# Patient Record
Sex: FEMALE | Race: WHITE
[De-identification: names, ages, dates, MRNs, and addresses within clinical notes are randomized per-mention and may not be internally consistent; named-entity substitution may affect disease eponyms.]

---

## 2019-08-01 ENCOUNTER — HOSPITAL ENCOUNTER (OUTPATIENT)
Dept: HOSPITAL 91 - OBT | Age: 24
Discharge: HOME | End: 2019-08-01
Payer: MEDICAID

## 2019-08-01 ENCOUNTER — HOSPITAL ENCOUNTER (OUTPATIENT)
Dept: HOSPITAL 10 - L-D | Age: 24
Discharge: HOME | End: 2019-08-01
Attending: OBSTETRICS & GYNECOLOGY
Payer: MEDICAID

## 2019-08-01 VITALS
BODY MASS INDEX: 36.01 KG/M2 | HEIGHT: 60 IN | WEIGHT: 183.42 LBS | BODY MASS INDEX: 36.01 KG/M2 | HEIGHT: 60 IN | WEIGHT: 183.42 LBS

## 2019-08-01 DIAGNOSIS — Z3A.38: ICD-10-CM

## 2019-08-01 DIAGNOSIS — O36.8130: Primary | ICD-10-CM

## 2019-08-01 PROCEDURE — 76818 FETAL BIOPHYS PROFILE W/NST: CPT

## 2019-08-01 PROCEDURE — G0463 HOSPITAL OUTPT CLINIC VISIT: HCPCS

## 2019-08-01 NOTE — TRIAGE
===================================

OB Triage

===================================

Datetime Report Generated by CPN: 08/01/2019 15:11

   

   

===========================

Datetime: 08/01/2019 14:15

===========================

   

   

===================================

Labor Evaluation

===================================

   

 Frequency:  irreg.

 Monitor Mode:  External

 Quality:  Mild

 Pattern:  Normal: <= 5 Contractions in 10 Minutes

 Resting Tone [place]:  Relaxed

 Contraction Comments:  Ptients denies feeling UC's - palpate soft

   

===================================

Fetal Heart Rate

===================================

   

 Monitor Mode:  External US

 Variability:  Moderate 6-25 bpm

 Accelerations:  15X15

 Decelerations:  None

 Category:  Category I

   

===================================

Pain Assessment

===================================

   

 Pain Presence:  None/Denies

 Pain Type:  N/A

   

===========================

Datetime: 08/01/2019 14:08

===========================

   

 EGA:  38.1

   

===========================

Datetime: 08/01/2019 13:39

===========================

   

 Assessment Type:  Triage

   

===================================

Maternal Assessment

===================================

   

 Level of Consciousness:  Keenly Alert, Responsive

 DTR's/Clonus:  DTRs 2+; No Clonus

 Headache:  Denies

 Blurred Vision:  No

 Respiratory Effort:  Unlabored; Regular Rhythm; Equal Expansion

 Breath Sounds, Left:  Clear and Equal

 Breath Sounds, Right:  Clear and Equal

 Nausea/Vomiting:  Denies

 RUQ Epigastric Pain:  Denies

 Lower Extremities Edema:  None

     Degree:  None

 Upper Extremities Edema:  None

     Degree:  None

 Facial Edema:  None

   

===================================

Fall Risk Assessment

===================================

   

 History of Falling:  (0) No

 Secondary Diagnosis:  (0) No

 Ambulatory Aid:  (0) Bedrest/Nurse Assist

 IV Therapy:  (0) No

 Gait:  (0) Normal/Bedrest/Immobile

 Mental Status:  (0) Oriented to Own Ability

 Fall Score:  0

 Fall Risk Score Definition:  No Risk: No action required

   

===========================

Datetime: 08/01/2019 13:36

===========================

   

 Time of Arrival:  08/01/2019 12:50

 Arrived By:  Ambulatory

 Arrived From:  Home

 Chief Complaint:  Decreased FM x 4 days

 Fetal Movement:  Decreased

 Contractions:  Denies/Absent

 Rupture of Membranes:  Denies

 Vaginal Bleeding:  None

 Vaginal Discharge:  Denies

 Abdominal Trauma:  Not Applicable

 Patient Complaints:  None

 Time Provider Notified:  08/01/2019 14:50

 Provider Notified:  RADHA

 Initial Plan:  bedside ultraside for MARCELLE and BPP

## 2019-08-01 NOTE — PN
Triage Information


Date/Time





 2019


Reason for visit:  DFM


Weeks of Gestation


38 weeks and 1 days


/Para





Additional information


24 years old  with IUP at 38 weeks and 1 days presented with complaint of 


decreased fetal movement, Denies any LOF or vaginal bleeding or uteirne 


contractions





Objective


VSS


Fetal Heart Rate:  130's


Fetal Heart Rate Comments


NST: Cat 1


Contractions:  None


Exam


GA: A^O, NAD


Abdomen: Soft, Gravid. Size consistent with date


NST: Cat 1


BPP: 8/8


MARCELLE: 12.8 cm





Results/Medications


Imaging Results


PROCEDURE:   OB ultrasound for biophysical profile 


 


CLINICAL INDICATION:   Decreased fetal movement.


 


TECHNIQUE:   Multiple sonographic images of the pelvis were obtained.  


Transabdominal view of the gravid uterus are available for review.  The images 


were reviewed on a PACS workstation.  


 


COMPARISON:   None 


 


FINDINGS:


 


Fetal breathing movement = 2/2


Fetal tone = 2/2


Fetal motion = 2/2


Quantitative amniotic fluid volume = 2/2


 


MARCELLE = 12.8 cm


Single live intrauterine pregnancy with fetal cardiac activity at 129 beats per 


minute.  


There is a anterior placenta without previa or abruption.


 


IMPRESSION:


 


1.  Single living intrauterine gestation in cephalic position.  


2.  Biophysical profile = 8/8.


3.  MARCELLE = 12.8 cm.


Disposition:  Discharge


Assessment/Plan


IUP at 38 weeks and 1 days


Decreased fetal movement


 testing reassuring


Doing well


No evidence of labor or PROM





DC home


Follow up in 2-3 days with OB office or sooner PRN


RT Traige if again experiences any LOF,. Vaginal bleeding, Decreased fetal 


movement or for any other concerns











CHRISTOPHER GARCIA MD               Aug 1, 2019 15:36

## 2019-08-09 ENCOUNTER — HOSPITAL ENCOUNTER (INPATIENT)
Dept: HOSPITAL 91 - OBT | Age: 24
LOS: 3 days | Discharge: HOME | End: 2019-08-12
Payer: MEDICAID

## 2019-08-09 ENCOUNTER — HOSPITAL ENCOUNTER (INPATIENT)
Dept: HOSPITAL 10 - L-D | Age: 24
LOS: 3 days | Discharge: HOME | End: 2019-08-12
Attending: OBSTETRICS & GYNECOLOGY | Admitting: OBSTETRICS & GYNECOLOGY
Payer: MEDICAID

## 2019-08-09 VITALS — HEART RATE: 76 BPM | RESPIRATION RATE: 18 BRPM | SYSTOLIC BLOOD PRESSURE: 135 MMHG | DIASTOLIC BLOOD PRESSURE: 74 MMHG

## 2019-08-09 VITALS — WEIGHT: 185.63 LBS | HEIGHT: 60 IN | BODY MASS INDEX: 36.44 KG/M2

## 2019-08-09 DIAGNOSIS — Z3A.39: ICD-10-CM

## 2019-08-09 LAB
ADD MAN DIFF?: NO
BASOPHIL #: 0.1 10^3/UL (ref 0–0.1)
BASOPHILS %: 0.5 % (ref 0–2)
EOSINOPHILS #: 0.1 10^3/UL (ref 0–0.5)
EOSINOPHILS %: 0.6 % (ref 0–7)
HEMATOCRIT: 37.4 % (ref 37–47)
HEMOGLOBIN: 12.3 G/DL (ref 12–16)
IMMATURE GRANS #M: 0.08 10^3/UL (ref 0–0.03)
IMMATURE GRANS % (M): 0.6 % (ref 0–0.43)
INR: 0.9
LYMPHOCYTES #: 2 10^3/UL (ref 0.8–2.9)
LYMPHOCYTES %: 15.8 % (ref 15–51)
MEAN CORPUSCULAR HEMOGLOBIN: 29.9 PG (ref 29–33)
MEAN CORPUSCULAR HGB CONC: 32.9 G/DL (ref 32–37)
MEAN CORPUSCULAR VOLUME: 90.8 FL (ref 82–101)
MEAN PLATELET VOLUME: 11.8 FL (ref 7.4–10.4)
MONOCYTE #: 0.7 10^3/UL (ref 0.3–0.9)
MONOCYTES %: 5.4 % (ref 0–11)
NEUTROPHIL #: 9.9 10^3/UL (ref 1.6–7.5)
NEUTROPHILS %: 77.1 % (ref 39–77)
NUCLEATED RED BLOOD CELLS #: 0 10^3/UL (ref 0–0)
NUCLEATED RED BLOOD CELLS%: 0 /100WBC (ref 0–0)
PARTIAL THROMBOPLASTIN TIME: 26.4 SEC (ref 23–35)
PLATELET COUNT: 213 10^3/UL (ref 140–415)
PROTIME: 12.3 SEC (ref 11.9–14.9)
PT RATIO: 1
RED BLOOD COUNT: 4.12 10^6/UL (ref 4.2–5.4)
RED CELL DISTRIBUTION WIDTH: 13.9 % (ref 11.5–14.5)
WHITE BLOOD COUNT: 12.8 10^3/UL (ref 4.8–10.8)

## 2019-08-09 PROCEDURE — 85610 PROTHROMBIN TIME: CPT

## 2019-08-09 PROCEDURE — 86900 BLOOD TYPING SEROLOGIC ABO: CPT

## 2019-08-09 PROCEDURE — 85018 HEMOGLOBIN: CPT

## 2019-08-09 PROCEDURE — 62322 NJX INTERLAMINAR LMBR/SAC: CPT

## 2019-08-09 PROCEDURE — 86901 BLOOD TYPING SEROLOGIC RH(D): CPT

## 2019-08-09 PROCEDURE — G0463 HOSPITAL OUTPT CLINIC VISIT: HCPCS

## 2019-08-09 PROCEDURE — 86592 SYPHILIS TEST NON-TREP QUAL: CPT

## 2019-08-09 PROCEDURE — 85014 HEMATOCRIT: CPT

## 2019-08-09 PROCEDURE — 86850 RBC ANTIBODY SCREEN: CPT

## 2019-08-09 PROCEDURE — 85730 THROMBOPLASTIN TIME PARTIAL: CPT

## 2019-08-09 PROCEDURE — 85025 COMPLETE CBC W/AUTO DIFF WBC: CPT

## 2019-08-09 PROCEDURE — 0KQM0ZZ REPAIR PERINEUM MUSCLE, OPEN APPROACH: ICD-10-PCS | Performed by: OBSTETRICS & GYNECOLOGY

## 2019-08-09 PROCEDURE — 0KQM0ZZ REPAIR PERINEUM MUSCLE, OPEN APPROACH: ICD-10-PCS

## 2019-08-09 RX ADMIN — PYRIDOXINE HYDROCHLORIDE 1 MLS/HR: 100 INJECTION, SOLUTION INTRAMUSCULAR; INTRAVENOUS at 15:16

## 2019-08-09 RX ADMIN — PYRIDOXINE HYDROCHLORIDE 1 MLS/HR: 100 INJECTION, SOLUTION INTRAMUSCULAR; INTRAVENOUS at 11:38

## 2019-08-09 RX ADMIN — Medication 1 MLS/HR: at 20:36

## 2019-08-09 RX ADMIN — PYRIDOXINE HYDROCHLORIDE SCH MLS/HR: 100 INJECTION, SOLUTION INTRAMUSCULAR; INTRAVENOUS at 11:38

## 2019-08-09 RX ADMIN — PYRIDOXINE HYDROCHLORIDE SCH MLS/HR: 100 INJECTION, SOLUTION INTRAMUSCULAR; INTRAVENOUS at 15:16

## 2019-08-09 RX ADMIN — BUTORPHANOL TARTRATE 1 MG: 2 INJECTION, SOLUTION INTRAMUSCULAR; INTRAVENOUS at 12:43

## 2019-08-09 NOTE — HP
Date/Time of Note


Date/Time of Note


DATE: 8/9/19 


TIME: 20:26





OB - History


Hx of Present Pregnancy


Chief Complaint:  contractionts


Estimated Due Date:  Aug 14, 2019


Obstetrical Complications:  None


Medical Complications:  None





Past Family/Social History


*


Past Medical, Surgical, Family and Obstetric Histories reviewed from prenatal 


chart.





OB  Admission Exam


Vital Signs


Vital Signs





Vital Signs


  Date      Temp  Pulse  Resp  B/P (MAP)   Pulse Ox  O2          O2 Flow    FiO2


Time                                                 Delivery    Rate


    8/9/19  98.0     76    18      135/74


     10:12                           (94)








Physical Exam


HEENT:  WNL


Heart:  Rhythm Normal


Lungs:  Clear


Cervical Dilatation:  7cm


Effacement:  75%


Station:  -2


Membranes:  Ruptured


Amniotic Fluid:  Clear


Contractions on Admission:  6-10 Minutes Apart


Intensity:  Moderate


Last 72 hours Lab Results


                                    CBC & BMP


8/9/19 11:30











OB  Assessment/Plan


Reason for admission:  active labor


Plan:  Other (IUP 39 weeks, in labor, EFW 3400 gr, augmantation of labor)











LORNE MENDEZ MD               Aug 9, 2019 20:29

## 2019-08-09 NOTE — PAC
Date/Time of Note


Date/Time of Note


DATE: 8/9/19 


TIME: 17:55





Post-Anesthesia Notes


Post-Anesthesia Note


Last documented vital signs





Vital Signs


  Date      Temp  Pulse  Resp  B/P (MAP)   Pulse Ox  O2          O2 Flow    FiO2


Time                                                 Delivery    Rate


    8/9/19  98.0     76    18      135/74


      1750                           (94)





Activity:  WNL


Respiratory function:  WNL


Cardiovascular function:  WNL


Mental status:  Baseline


Pain reasonably controlled:  Yes


Hydration appropriate:  Yes


Nausea/Vomiting absent:  Yes











CISCO STANTON                  Aug 9, 2019 17:55

## 2019-08-09 NOTE — PREAC
Date/Time of Note


Date/Time of Note


DATE: 8/9/19 


TIME: 15:43





Anesthesia Eval and Record


Evaluation


Time Pre-Procedure Interview


DATE: 8/9/19 


TIME: 15:43


Age


24


Sex


female


NPO:  8 hrs


Preoperative diagnosis


iup at 39 weeks


Planned procedure


labor epidural





Past Medical History


Past Medical History:  Includes


GI:  Obesity





Surgery & Anesthesia Issues


No known issue





Meds


Anticoagulation:  No


Beta Blocker within 24 hr:  No


Reason Beta Blocker not given:  Pt. not on B-Blocker


No Active Prescriptions or Reported Meds





Current Medications


Lactated Ringer's 1,000 ml @  125 mls/hr Q8H IV  Last administered on 8/9/19at 


15:16; Admin Dose 125 MLS/HR;  Start 8/9/19 at 11:13


Butorphanol Tartrate (Stadol) 1 mg Q2H  PRN IV .PAIN SCALE 1-5 Last administered


on 8/9/19at 12:43; Admin Dose 1 MG;  Start 8/9/19 at 11:30


Butorphanol Tartrate (Stadol) 2 mg Q2H  PRN IV .PAIN SCALE 6-10;  Start 8/9/19 


at 11:30


Lidocaine (Xylocaine 1% (Mpf)) 30 ml ONCE PRN INJ .EPISIOTOMY;  Start 8/9/19 at 


11:30


Oxytocin/Lactated Ringer's 500 ml @  500 mls/hr ONCE POST PARTUM IV ;  Start 


8/9/19 at 11:30


Oxytocin/Lactated Ringer's 500 ml @  125 mls/hr POST PARTUM IV ;  Start 8/9/19 


at 11:30


Oxytocin/Lactated Ringer's 500 ml @ 0 mls/hr ONCE PRN IV .VAGINAL BLEEDING;  


Start 8/9/19 at 11:30


Methylergonovine Maleate (Methergine) 0.2 mg ONCE PRN IM .VAGINAL BLEEDING;  


Start 8/9/19 at 11:30


Carboprost Tromethamine (Hemabate) 250 mcg ONCE PRN IM .VAGINAL BLEEDING;  Start


8/9/19 at 11:30


Misoprostol (Cytotec) 1,000 mcg ONCE PRN RI .VAGINAL BLEEDING;  Start 8/9/19 at 


11:30


Meds reviewed:  Yes





Allergies


Coded Allergies:  


     No Known Drug Allergies (Verified  Allergy, Unknown, 8/1/19)


Allergies Reviewed:  Yes





Labs/Studies


Labs Reviewed:  Reviewed by anesthesiologist


Result Diagram:  


8/9/19 1130





Laboratory Tests


8/9/19 11:30








Pregnancy test:  Positive





Pre-procedure Exam


Last vitals





Vital Signs


  Date      Temp  Pulse  Resp  B/P (MAP)   Pulse Ox  O2          O2 Flow    FiO2


Time                                                 Delivery    Rate


    8/9/19  98.0     76    18      135/74


     10:12                           (94)





Airway:  Adequate mouth opening, Adequate thyromental dist


Mallampati:  Mallampati II


Teeth:  Normal


Lung:  Normal


Heart:  Normal





ASA Physical Status


ASA physical status:  2


Emergency:  None





Planned Anesthetic


Neuraxial:  Epidural





Planned Pain Management


Parenteral pain med





Pre-operative Attestations


Prior to commencing anesthesia and surgery, the patient was re-evaluated, there 


was verification of:


*The patient's identity


*The results of appropriate recent lab work and preoperative vital signs


*The above evaluation not changing prior to induction


*Anesthetic plan, risk benefits, alternative and complications discussed with 


patient/family; questions answered; patient/family understands, accepts and 


wishes to proceed.











CISCO STANTON                  Aug 9, 2019 15:44

## 2019-08-10 VITALS — HEART RATE: 82 BPM | DIASTOLIC BLOOD PRESSURE: 72 MMHG | SYSTOLIC BLOOD PRESSURE: 119 MMHG | RESPIRATION RATE: 18 BRPM

## 2019-08-10 VITALS — HEART RATE: 83 BPM | RESPIRATION RATE: 18 BRPM | SYSTOLIC BLOOD PRESSURE: 124 MMHG | DIASTOLIC BLOOD PRESSURE: 61 MMHG

## 2019-08-10 VITALS — RESPIRATION RATE: 19 BRPM | HEART RATE: 75 BPM | DIASTOLIC BLOOD PRESSURE: 74 MMHG | SYSTOLIC BLOOD PRESSURE: 154 MMHG

## 2019-08-10 VITALS — DIASTOLIC BLOOD PRESSURE: 61 MMHG | SYSTOLIC BLOOD PRESSURE: 126 MMHG | RESPIRATION RATE: 18 BRPM | HEART RATE: 79 BPM

## 2019-08-10 VITALS — DIASTOLIC BLOOD PRESSURE: 69 MMHG | RESPIRATION RATE: 20 BRPM | SYSTOLIC BLOOD PRESSURE: 146 MMHG | HEART RATE: 75 BPM

## 2019-08-10 VITALS — SYSTOLIC BLOOD PRESSURE: 121 MMHG | RESPIRATION RATE: 18 BRPM | HEART RATE: 78 BPM | DIASTOLIC BLOOD PRESSURE: 75 MMHG

## 2019-08-10 LAB
HEMATOCRIT: 30.4 % (ref 37–47)
HEMOGLOBIN: 10.2 G/DL (ref 12–16)
RAPID PLASMA REAGIN: NONREACTIVE

## 2019-08-10 RX ADMIN — PYRIDOXINE HYDROCHLORIDE 1 MLS/HR: 100 INJECTION, SOLUTION INTRAMUSCULAR; INTRAVENOUS at 08:00

## 2019-08-10 RX ADMIN — LIDOCAINE HYDROCHLORIDE 1 ML: 10 INJECTION, SOLUTION EPIDURAL; INFILTRATION; INTRACAUDAL; PERINEURAL at 03:09

## 2019-08-10 RX ADMIN — HYDROCODONE BITARTRATE AND ACETAMINOPHEN 1 TAB: 5; 325 TABLET ORAL at 20:03

## 2019-08-10 RX ADMIN — PYRIDOXINE HYDROCHLORIDE 1 MLS/HR: 100 INJECTION, SOLUTION INTRAMUSCULAR; INTRAVENOUS at 07:43

## 2019-08-10 RX ADMIN — DOCUSATE SODIUM AND SENNOSIDES SCH TAB: 8.6; 5 TABLET, FILM COATED ORAL at 11:51

## 2019-08-10 RX ADMIN — IBUPROFEN SCH MG: 800 TABLET ORAL at 05:55

## 2019-08-10 RX ADMIN — PYRIDOXINE HYDROCHLORIDE SCH MLS/HR: 100 INJECTION, SOLUTION INTRAMUSCULAR; INTRAVENOUS at 07:43

## 2019-08-10 RX ADMIN — Medication 1 MLS/HR: at 03:08

## 2019-08-10 RX ADMIN — IBUPROFEN SCH MG: 800 TABLET ORAL at 18:00

## 2019-08-10 RX ADMIN — WITCH HAZEL 1 PAD: 500 SOLUTION RECTAL; TOPICAL at 05:55

## 2019-08-10 RX ADMIN — IBUPROFEN SCH MG: 800 TABLET ORAL at 12:00

## 2019-08-10 RX ADMIN — DOCUSATE SODIUM AND SENNOSIDES 1 TAB: 8.6; 5 TABLET, FILM COATED ORAL at 22:20

## 2019-08-10 RX ADMIN — IBUPROFEN 1 MG: 800 TABLET, FILM COATED ORAL at 18:00

## 2019-08-10 RX ADMIN — PYRIDOXINE HYDROCHLORIDE 1 MLS/HR: 100 INJECTION, SOLUTION INTRAMUSCULAR; INTRAVENOUS at 19:00

## 2019-08-10 RX ADMIN — IBUPROFEN 1 MG: 800 TABLET, FILM COATED ORAL at 12:00

## 2019-08-10 RX ADMIN — PYRIDOXINE HYDROCHLORIDE SCH MLS/HR: 100 INJECTION, SOLUTION INTRAMUSCULAR; INTRAVENOUS at 08:00

## 2019-08-10 RX ADMIN — DOCUSATE SODIUM AND SENNOSIDES 1 TAB: 8.6; 5 TABLET, FILM COATED ORAL at 11:51

## 2019-08-10 RX ADMIN — PYRIDOXINE HYDROCHLORIDE SCH MLS/HR: 100 INJECTION, SOLUTION INTRAMUSCULAR; INTRAVENOUS at 19:00

## 2019-08-10 RX ADMIN — IBUPROFEN 1 MG: 800 TABLET, FILM COATED ORAL at 05:55

## 2019-08-10 RX ADMIN — DOCUSATE SODIUM AND SENNOSIDES SCH TAB: 8.6; 5 TABLET, FILM COATED ORAL at 22:20

## 2019-08-10 RX ADMIN — FENTANYL CIT 0.2 MG/100ML-ROPIV 0.2%-NACL 0.9% EPIDURAL INJ 1 ML: 2/0.2 SOLUTION at 00:02

## 2019-08-10 RX ADMIN — Medication 1 SPRAY: at 05:55

## 2019-08-10 NOTE — LDN
Date/Time of Note


Date/Time of Note


DATE: 8/10/19 


TIME: 03:13





Delivery Summary


patient in dorsal lithotomy position, pushing with contractionts


Infant head delivered atraumattically, nouse and mouth succtioned


Cord clump and cut, infant heand of fercho RN.


second degree vaginal lacerationt repaired with 2 0 chromic


placenta delivered spontaneusly, 


no bleeding


ice pack to perineum


count correct


Weeks of Gestation


39 weeks


Placenta Delivered:  Spontaneously


Episiotomy:  No


Anesthesia type:  Epidural


Estimated blood loss:  300


Sponge & Needle done & correct:  Yes


All needle counts correct:  Yes











LORNE MENDEZ MD              Aug 10, 2019 03:17

## 2019-08-11 VITALS — SYSTOLIC BLOOD PRESSURE: 124 MMHG | DIASTOLIC BLOOD PRESSURE: 60 MMHG | HEART RATE: 76 BPM | RESPIRATION RATE: 18 BRPM

## 2019-08-11 VITALS — RESPIRATION RATE: 18 BRPM | DIASTOLIC BLOOD PRESSURE: 58 MMHG | SYSTOLIC BLOOD PRESSURE: 122 MMHG | HEART RATE: 83 BPM

## 2019-08-11 VITALS — HEART RATE: 84 BPM | SYSTOLIC BLOOD PRESSURE: 128 MMHG | DIASTOLIC BLOOD PRESSURE: 76 MMHG | RESPIRATION RATE: 18 BRPM

## 2019-08-11 VITALS — DIASTOLIC BLOOD PRESSURE: 80 MMHG | SYSTOLIC BLOOD PRESSURE: 117 MMHG | RESPIRATION RATE: 17 BRPM | HEART RATE: 74 BPM

## 2019-08-11 VITALS — HEART RATE: 59 BPM | SYSTOLIC BLOOD PRESSURE: 122 MMHG | DIASTOLIC BLOOD PRESSURE: 85 MMHG | RESPIRATION RATE: 18 BRPM

## 2019-08-11 LAB
ADD MAN DIFF?: NO
BASOPHIL #: 0.1 10^3/UL (ref 0–0.1)
BASOPHILS %: 0.3 % (ref 0–2)
EOSINOPHILS #: 0.2 10^3/UL (ref 0–0.5)
EOSINOPHILS %: 1.4 % (ref 0–7)
HEMATOCRIT: 29 % (ref 37–47)
HEMOGLOBIN: 9.5 G/DL (ref 12–16)
IMMATURE GRANS #M: 0.11 10^3/UL (ref 0–0.03)
IMMATURE GRANS % (M): 0.7 % (ref 0–0.43)
LYMPHOCYTES #: 3.1 10^3/UL (ref 0.8–2.9)
LYMPHOCYTES %: 19.5 % (ref 15–51)
MEAN CORPUSCULAR HEMOGLOBIN: 29.7 PG (ref 29–33)
MEAN CORPUSCULAR HGB CONC: 32.8 G/DL (ref 32–37)
MEAN CORPUSCULAR VOLUME: 90.6 FL (ref 82–101)
MEAN PLATELET VOLUME: 11.4 FL (ref 7.4–10.4)
MONOCYTE #: 0.9 10^3/UL (ref 0.3–0.9)
MONOCYTES %: 5.5 % (ref 0–11)
NEUTROPHIL #: 11.5 10^3/UL (ref 1.6–7.5)
NEUTROPHILS %: 72.6 % (ref 39–77)
NUCLEATED RED BLOOD CELLS #: 0 10^3/UL (ref 0–0)
NUCLEATED RED BLOOD CELLS%: 0 /100WBC (ref 0–0)
PLATELET COUNT: 182 10^3/UL (ref 140–415)
RED BLOOD COUNT: 3.2 10^6/UL (ref 4.2–5.4)
RED CELL DISTRIBUTION WIDTH: 14.3 % (ref 11.5–14.5)
WHITE BLOOD COUNT: 15.8 10^3/UL (ref 4.8–10.8)

## 2019-08-11 RX ADMIN — IBUPROFEN 1 MG: 800 TABLET, FILM COATED ORAL at 00:11

## 2019-08-11 RX ADMIN — IBUPROFEN SCH MG: 800 TABLET ORAL at 12:00

## 2019-08-11 RX ADMIN — IBUPROFEN SCH MG: 800 TABLET ORAL at 00:11

## 2019-08-11 RX ADMIN — IBUPROFEN 1 MG: 800 TABLET, FILM COATED ORAL at 06:00

## 2019-08-11 RX ADMIN — DOCUSATE SODIUM AND SENNOSIDES 1 TAB: 8.6; 5 TABLET, FILM COATED ORAL at 09:58

## 2019-08-11 RX ADMIN — DOCUSATE SODIUM AND SENNOSIDES SCH TAB: 8.6; 5 TABLET, FILM COATED ORAL at 09:58

## 2019-08-11 RX ADMIN — IBUPROFEN 1 MG: 800 TABLET, FILM COATED ORAL at 12:00

## 2019-08-11 RX ADMIN — HYDROCODONE BITARTRATE AND ACETAMINOPHEN 1 TAB: 5; 325 TABLET ORAL at 20:35

## 2019-08-11 RX ADMIN — IBUPROFEN SCH MG: 800 TABLET ORAL at 18:00

## 2019-08-11 RX ADMIN — IBUPROFEN SCH MG: 800 TABLET ORAL at 06:00

## 2019-08-11 RX ADMIN — DOCUSATE SODIUM AND SENNOSIDES SCH TAB: 8.6; 5 TABLET, FILM COATED ORAL at 21:06

## 2019-08-11 RX ADMIN — IBUPROFEN 1 MG: 800 TABLET, FILM COATED ORAL at 18:00

## 2019-08-11 RX ADMIN — DOCUSATE SODIUM AND SENNOSIDES 1 TAB: 8.6; 5 TABLET, FILM COATED ORAL at 21:06

## 2019-08-11 NOTE — QN
Documentation


Comment


PPD#1 is stable afebrile tolerates diet No VB +Flatus +voids


VS stable


Gen NAD


Abd soft NT ND


Genitalia No blood at Perineum


--->Discharge Home tomorrow


--->precautions discussed











FELICITA DAVIDSON M.D.            Aug 11, 2019 13:19

## 2019-08-12 VITALS — SYSTOLIC BLOOD PRESSURE: 118 MMHG | RESPIRATION RATE: 18 BRPM | DIASTOLIC BLOOD PRESSURE: 72 MMHG | HEART RATE: 75 BPM

## 2019-08-12 VITALS — HEART RATE: 79 BPM | RESPIRATION RATE: 18 BRPM | SYSTOLIC BLOOD PRESSURE: 107 MMHG | DIASTOLIC BLOOD PRESSURE: 62 MMHG

## 2019-08-12 LAB
ADD MAN DIFF?: NO
BASOPHIL #: 0.1 10^3/UL (ref 0–0.1)
BASOPHILS %: 0.3 % (ref 0–2)
EOSINOPHILS #: 0.2 10^3/UL (ref 0–0.5)
EOSINOPHILS %: 1.4 % (ref 0–7)
HEMATOCRIT: 29.2 % (ref 37–47)
HEMOGLOBIN: 9.5 G/DL (ref 12–16)
IMMATURE GRANS #M: 0.11 10^3/UL (ref 0–0.03)
IMMATURE GRANS % (M): 0.8 % (ref 0–0.43)
LYMPHOCYTES #: 2.6 10^3/UL (ref 0.8–2.9)
LYMPHOCYTES %: 17.5 % (ref 15–51)
MEAN CORPUSCULAR HEMOGLOBIN: 29.4 PG (ref 29–33)
MEAN CORPUSCULAR HGB CONC: 32.5 G/DL (ref 32–37)
MEAN CORPUSCULAR VOLUME: 90.4 FL (ref 82–101)
MEAN PLATELET VOLUME: 11 FL (ref 7.4–10.4)
MONOCYTE #: 0.7 10^3/UL (ref 0.3–0.9)
MONOCYTES %: 4.7 % (ref 0–11)
NEUTROPHIL #: 11 10^3/UL (ref 1.6–7.5)
NEUTROPHILS %: 75.3 % (ref 39–77)
NUCLEATED RED BLOOD CELLS #: 0 10^3/UL (ref 0–0)
NUCLEATED RED BLOOD CELLS%: 0 /100WBC (ref 0–0)
PLATELET COUNT: 209 10^3/UL (ref 140–415)
RED BLOOD COUNT: 3.23 10^6/UL (ref 4.2–5.4)
RED CELL DISTRIBUTION WIDTH: 14.4 % (ref 11.5–14.5)
WHITE BLOOD COUNT: 14.6 10^3/UL (ref 4.8–10.8)

## 2019-08-12 RX ADMIN — MEASLES, MUMPS, AND RUBELLA VIRUS VACCINE LIVE 1 ML: 1000; 12500; 1000 INJECTION, POWDER, LYOPHILIZED, FOR SUSPENSION SUBCUTANEOUS at 09:00

## 2019-08-12 RX ADMIN — CLOSTRIDIUM TETANI TOXOID ANTIGEN (FORMALDEHYDE INACTIVATED), CORYNEBACTERIUM DIPHTHERIAE TOXOID ANTIGEN (FORMALDEHYDE INACTIVATED), BORDETELLA PERTUSSIS TOXOID ANTIGEN (GLUTARALDEHYDE INACTIVATED), BORDETELLA PERTUSSIS FILAMENTOUS HEMAGGLUTININ ANTIGEN (FORMALDEHYDE INACTIVATED), BORDETELLA PERTUSSIS PERTACTIN ANTIGEN, AND BORDETELLA PERTUSSIS FIMBRIAE 2/3 ANTIGEN 1 ML: 5; 2; 2.5; 5; 3; 5 INJECTION, SUSPENSION INTRAMUSCULAR at 09:00

## 2019-08-12 RX ADMIN — IBUPROFEN 1 MG: 800 TABLET, FILM COATED ORAL at 00:00

## 2019-08-12 RX ADMIN — IBUPROFEN SCH MG: 800 TABLET ORAL at 12:28

## 2019-08-12 RX ADMIN — DOCUSATE SODIUM AND SENNOSIDES SCH TAB: 8.6; 5 TABLET, FILM COATED ORAL at 10:14

## 2019-08-12 RX ADMIN — IBUPROFEN SCH MG: 800 TABLET ORAL at 05:50

## 2019-08-12 RX ADMIN — IBUPROFEN 1 MG: 800 TABLET, FILM COATED ORAL at 05:50

## 2019-08-12 RX ADMIN — IBUPROFEN SCH MG: 800 TABLET ORAL at 00:00

## 2019-08-12 RX ADMIN — IBUPROFEN 1 MG: 800 TABLET, FILM COATED ORAL at 12:28

## 2019-08-12 RX ADMIN — DOCUSATE SODIUM AND SENNOSIDES 1 TAB: 8.6; 5 TABLET, FILM COATED ORAL at 10:14

## 2019-08-12 NOTE — DS
Date/Time of Note


Date/Time of Note


DATE: 8/12/19 


TIME: 10:05





Obstetrical Discharge Record


Final Diagnosis


Final Diagnosis:  Term delivered


Other Final Diagnosis


Hospital day: 4





PNL- O positive, Rubella Immune, GBS negative; all others normal/negative


Prenatal labs: 


Blood type: O+   


Rubella: immune


HepBSAg: nonreactive


RPR: NR


HIV: negative


GC/CT: negative


GBS: negative





1.  Spontaneous vaginal deliveryadmitted on 8/9/2019 in active labor.  She 


delivered via spontaneous vaginal delivery on 8/10/2019.  She had a second-


degree perineal laceration which was repaired with 2-0 chromic.  Hospital course


was obligated by acute blood loss anemia.  No apparent distress.  Fundus firm 


and below the level of the umbilicus.  Extremities nontender to palpation.  


Tolerated a regular diet prior to discharge.  Pain well controlled.  Lochia 


within normal limits.  Is feeding.  Undecided on form of contraception.


2.  Acute blood loss anemiavitals stable at 107/62./H9 0.5/29.2.  Asymptomatic 


prior to discharge.





Vaginal Delivery


Obstetrical Delivery:  Spontaneous





Condition on Discharge


Physical Assessment


Patient Condition:  Stable











JANICE TURK MD             Aug 12, 2019 10:06

## 2019-08-13 NOTE — DELSUM
===================================

Delivery Summary A-C

===================================

Datetime Report Generated by CPN: 2019 16:39

   

   

===================================

DELIVERY PERSONNEL

===================================

   

Circulator:  Tammie Malissa

   

===================================

MATERNAL INFORMATION

===================================

   

Delivery Anesthesia:  Epidural

Medications in Delivery:  Pitocin 30 Units LR

Delivery QBL (ml):  300

Placenta Cultured:  No

Maternal Complications:  None

   

===================================

LABOR SUMMARY

===================================

   

EDC:  2019 00:00

No. Babies in Womb:  1

 Attempted:  No

Labor Anesthesia:  None

   

===================================

LABOR INFORMATION

===================================

   

Reason for Induction:  Not Applicable

Onset of Labor:  2019 16:24

Complete Dilatation:  08/10/2019 02:09

Oxytocin:  Augmentation

Group B Beta Strep:  Negative

Antibiotics # of Doses:  0

Steroids Given:  None

Reason Steroids Not Administered:  Not Applicable

   

===================================

MEMBRANES

===================================

   

Membranes Rupture Method:  Spontaneous

Rupture of Membranes:  2019 20:05

Length of Rupture (hr):  6.67

Amniotic Fluid Color:  Clear

Amniotic Fluid Amount:  Moderate

Amniotic Fluid Odor:  None

   

===================================

STAGES OF LABOR

===================================

   

Stage 1 hr:  9

Stage 1 min:  45

Stage 2 hr:  0

Stage 2 min:  36

Stage 3 hr:  0

Stage 3 min:  12

Total Time in Labor hr:  10

Total Time in Labor min:  33

   

===================================

VAGINAL DELIVERY

===================================

   

Laceration Extension:  Second Degree

Laceration Type:  Vaginal

Laceration Repair:  Yes

Initial Vag Sponge Count:  10

Final Vag Sponge Count:  10

Initial Vag Sharps Count:  1

Final Vag Sharps Count:  5

Sponge Count Correct:  Yes; Vaginal Sweep Performed

Sharps Count Correct:  Yes

   

===================================

BABY A INFORMATION

===================================

   

Infant Delivery Date/Time:  08/10/2019 02:45

Method of Delivery:  Vaginal

Born in Route :  No

:  N/A

Forceps:  N/A

Vacuum Extraction:  N/A

Shoulder Dystocia :  N/A

   

===================================

SHOULDER DYSTOCIA BABY A

===================================

   

Infant Delivery Date/Time:  08/10/2019 02:45

   

===================================

PRESENTATION/POSITION BABY A

===================================

   

Presentation:  Cephalic

Cephalic Presentation:  Vertex

Vertex Position:  Left Occipital Anterior

Breech Presentation:  N/A

   

===================================

PLACENTA INFORMATION BABY A

===================================

   

Placenta Delivery Time :  08/10/2019 02:57

Placenta Method of Delivery:  Spontaneous

Placenta Status:  Delivered

   

===================================

APGAR SCORES BABY A

===================================

   

Heart Rate 1 min:  >100 bpm

Resp Effort 1 min:  Good Cry

Reflex Irritability 1 min:  Cough/Sneeze/Pulls Away

Muscle Tone 1 min:  Active Motion

Color 1 min:  Blue/Pale

Resuscitation Effort 1 min:  Tactile Stimulation

APGAR SCORE 1 MIN:  8

Heart Rate 5 min:  >100 bpm

Resp Effort 5 min:  Good Cry

Reflex Irritability 5 min:  Cough/Sneeze/Pulls Away

Muscle Tone 5 min:  Active Motion

Color 5 min:  Body Pink, Extremit Blue

Resuscitation Effort 5 min:  Tactile Stimulation

APGAR SCORE 5 MIN:  9

   

===================================

INFANT INFORMATION BABY A

===================================

   

Gestational Age at Delivery:  39.3

Gestational Status:  Full Term- 39- 40.6 Weeks

Infant Outcome :  Liveborn, with signs of life

Infant Condition :  Stable

Infant Sex:  Male

   

===================================

IDENTIFICATION/MEDS BABY A

===================================

   

ID Band Number:  [id number]

ID Band Location:  Right Leg; Left Arm



Sensor Applied:  Yes

Sensor Number:  [id number]

Sensor Location :  Cord Clamp

Vitamin K Given :  Not Given

Erythromycin Given:  Not Given

   

===================================

WEIGHT/LENGTH BABY A

===================================

   

Infant Birthweight (gm):  3980

Infant Weight (lb):  8

Infant Weight (oz):  12

Infant Length (in):  21.00

Infant Length (cm):  53.34

   

===================================

CORD INFORMATION BABY A

===================================

   

No. Cord Vessels:  3

Nuchal Cord :  N/A

Cord Blood Taken:  No

Infant Suction:  Mouth; Nose

   

===================================

ASSESSMENT BABY A

===================================

   

Infant Complications:  None

Physical Findings at Delivery:  Caput Succedaneum

Infant Respirations:  Appears Normal

Neonatologist/ALS Called :  No

Infant Care By:  Elizabeth Rodgers

Transferred To:  Remains with Mother